# Patient Record
Sex: MALE | Race: WHITE
[De-identification: names, ages, dates, MRNs, and addresses within clinical notes are randomized per-mention and may not be internally consistent; named-entity substitution may affect disease eponyms.]

---

## 2020-03-11 ENCOUNTER — HOSPITAL ENCOUNTER (EMERGENCY)
Dept: HOSPITAL 25 - UCEAST | Age: 35
Discharge: HOME | End: 2020-03-11
Payer: COMMERCIAL

## 2020-03-11 VITALS — DIASTOLIC BLOOD PRESSURE: 84 MMHG | SYSTOLIC BLOOD PRESSURE: 125 MMHG
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
## 2020-03-11 DIAGNOSIS — S61.011A: Primary | ICD-10-CM

## 2020-03-11 DIAGNOSIS — Z88.0: ICD-10-CM
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
## 2020-03-11 PROCEDURE — 12002 RPR S/N/AX/GEN/TRNK2.6-7.5CM: CPT

## 2020-03-11 PROCEDURE — G0463 HOSPITAL OUTPT CLINIC VISIT: HCPCS

## 2020-03-11 PROCEDURE — 99201: CPT

## 2020-03-11 NOTE — UC
Hand/Wrist HPI





- HPI Summary


HPI Summary: 


WHILE AT WORK TODAY SUSTAINED A LACERATION TO THE PAD OF HIS RIGHT THUMB WITH A 

TABLE SAW.  PATIENT STATES COPIOUS BLEEDING IMMEDIATELY AFTER THE INJURY BUT IT 

STOPPED WITH PRESSURE.  UP-TO-DATE TETANUS 2018.  HAS FULL RANGE OF MOTION.  NO 

NUMBNESS.








- History Of Current Complaint


Chief Complaint: UCLaceration


Stated Complaint: THUMB LAC


Time Seen by Provider: 03/11/20 16:23


Hx Obtained From: Patient, Family/Caretaker - MOM


Onset/Duration: Sudden Onset, Lasting Hours, Still Present


Severity Initially: Moderate


Severity Currently: Moderate


Pain Intensity: 4


Pain Scale Used: 0-10 Numeric


Character Of Pain: Sharp


Aggravating Factor(s): Movement


Alleviating Factor(s): Rest


Associated Signs And Symptoms: Negative: Numbness/Tingling


Related History: Dominant Hand Right





- Allergies/Home Medications


Allergies/Adverse Reactions: 


 Allergies











Allergy/AdvReac Type Severity Reaction Status Date / Time


 


Penicillins Allergy Severe anaphylaxis Verified 03/11/20 15:35


 


all cilluns Allergy Severe anaphylaxis Uncoded 03/11/20 15:35











Home Medications: 


 Home Medications





NK [No Home Medications Reported]  03/11/20 [History Confirmed 03/11/20]











PMH/Surg Hx/FS Hx/Imm Hx


Previously Healthy: Yes





- Surgical History


Surgical History: Yes


Surgery Procedure, Year, and Place: appy





- Family History


Known Family History: Positive: Non-Contributory





- Social History


Alcohol Use: None


Substance Use Type: None


Smoking Status (MU): Never Smoked Tobacco





Review of Systems


All Other Systems Reviewed And Are Negative: Yes


Constitutional: Positive: Negative


Skin: Positive: Other - RIGHT THUMB LAC


Respiratory: Positive: Negative


Cardiovascular: Positive: Negative


Gastrointestinal: Positive: Negative


Musculoskeletal: Positive: Negative





Physical Exam


Triage Information Reviewed: Yes


Appearance: Well-Appearing, No Pain Distress, Well-Nourished


Vital Signs: 


 Initial Vital Signs











Temp  98.7 F   03/11/20 15:31


 


Pulse  74   03/11/20 15:31


 


Resp  16   03/11/20 15:31


 


BP  129/90   03/11/20 15:31


 


Pulse Ox  99   03/11/20 15:31











Vital Signs Reviewed: Yes


Eyes: Positive: Conjunctiva Clear


ENT: Positive: Hearing grossly normal


Neck: Positive: Supple


Respiratory: Positive: No respiratory distress, No accessory muscle use


Cardiovascular: Positive: Pulses Normal


Abdomen Description: Positive: Soft


Musculoskeletal: Positive: ROM Intact, No Edema, Other: - NO BONY TENDERNESS


Neurological: Positive: Alert


Psychological: Positive: Age Appropriate Behavior


Skin: Positive: Other - 4CM LINEAR LACERATION PAD RIGHT THUMB





Procedures





- Laceration/Wound Repair


  ** 1


Location: upper extremity - RIGHT THUMB


Description: Linear


Anesthesia: Local, 1.0%, Lido


Length, Depth and Shape: 3.5CM LONG, 5MM DEEP, 0MM WIDE AFTER REPAIR


Laceration/Wound Explored: clean


Closure: Single Layer - 8 SIMPLE INTERRUPTED


Suture Type: Prolene - 4-0


Number of Sutures: 8


Sterile Dressing Applied?: Yes





Diagnostics





- Radiology


  ** RIGHT THUMB XRAYS


Radiology Interpretation Completed By: Radiologist


Summary of Radiographic Findings: Normal right thumb radiograph without 

identification of subcutaneous foreign body or fracture.





Hand/Wrist Course/Dx





- Course


Course Of Treatment: 





TIME OUT COMPLETE.  LACERATION REPAIRED WITH 8 SIMPLE INTERPRETED SUTURES USING 

4-0 PROLENE. TUBE GAUZE APPLIED. PATIENT TOLERATED PROCEDURE WELL.  RETURN FOR 

SUTURE REMOVAL IN 10 DAYS.  X-RAY NEGATIVE FOR FRACTURE OR FOREIGN BODY.





- Differential Dx/Diagnosis


Provider Diagnosis: 


 Laceration of right thumb








Discharge ED





- Sign-Out/Discharge


Documenting (check all that apply): Patient Departure


All imaging exams completed and their final reports reviewed: Yes





- Discharge Plan


Condition: Stable


Disposition: HOME


Patient Education Materials:  Finger Laceration (ED)


Referrals: 


[facility] [Outside] - If Needed


Additional Instructions: 


KEEP DRESSINGS IN PLACE AND DRY FOR THE FIRST 24 HRS. THEN YOU MAY REMOVE THE 

DRESSING AND GENTLY CLEANSE WITH SOAP AND WATER. PAT DRY AND RE-BANDAGE.





APPLY THIN LAYER ANTIBIOTIC OINTMENT UNDER BANDAGE FOR FIRST 3-4 DAYS ONLY. I 

RECOMMEND AVOIDING NEOMYCIN CONTAINING PRODUCTS AS THEY CAN BE HIGHLY 

ALLERGENIC. CHANGE BANDAGE DAILY AND AS NEEDED IF IT BECOMES SOILED OR WET.





SEEK FOLLOW-UP IF YOU DEVELOP SPREADING REDNESS OF THE SKIN, PURULENT DRAINAGE, 

FEVER, INCREASED PAIN OR ANY OTHER CONCERNING SYMPTOMS.





RETURN TO HAVE YOUR 8 SUTURES REMOVED IN 10 DAYS








CALL THE NUMBER BELOW FOR ASSISTANCE IN ESTABLISHING WITH A PCP


An additional resource available to assist in finding the appropriate physician 

for your health care needs is [facility] ([facility]).  

You may contact them by calling [phone].





- Billing Disposition and Condition


Condition: STABLE


Disposition: Home